# Patient Record
Sex: MALE | URBAN - METROPOLITAN AREA
[De-identification: names, ages, dates, MRNs, and addresses within clinical notes are randomized per-mention and may not be internally consistent; named-entity substitution may affect disease eponyms.]

---

## 2018-12-25 ENCOUNTER — HOSPITAL ENCOUNTER (EMERGENCY)
Facility: CLINIC | Age: 28
Discharge: HOME | End: 2018-12-25
Attending: EMERGENCY MEDICINE | Admitting: EMERGENCY MEDICINE

## 2018-12-25 ENCOUNTER — APPOINTMENT (OUTPATIENT)
Dept: GENERAL RADIOLOGY | Facility: CLINIC | Age: 28
End: 2018-12-25
Attending: EMERGENCY MEDICINE

## 2018-12-25 VITALS
BODY MASS INDEX: 22.76 KG/M2 | HEART RATE: 89 BPM | TEMPERATURE: 98 F | SYSTOLIC BLOOD PRESSURE: 119 MMHG | RESPIRATION RATE: 16 BRPM | OXYGEN SATURATION: 100 % | DIASTOLIC BLOOD PRESSURE: 84 MMHG | WEIGHT: 145 LBS | HEIGHT: 67 IN

## 2018-12-25 DIAGNOSIS — S43.005A SHOULDER DISLOCATION, LEFT, INITIAL ENCOUNTER: ICD-10-CM

## 2018-12-25 PROCEDURE — 73030 X-RAY EXAM OF SHOULDER: CPT | Mod: LT

## 2018-12-25 PROCEDURE — 23650 CLTX SHO DSLC W/MNPJ WO ANES: CPT | Mod: LT

## 2018-12-25 PROCEDURE — 99283 EMERGENCY DEPT VISIT LOW MDM: CPT | Mod: 25

## 2018-12-25 PROCEDURE — 40000986 XR SHOULDER 2 VIEW LEFT: Mod: LT

## 2018-12-25 ASSESSMENT — MIFFLIN-ST. JEOR: SCORE: 1596.35

## 2018-12-25 NOTE — ED AVS SNAPSHOT
Emergency Department  [address] [place] [id number]  Phone:  [phone]  Fax:  [phone]                                    Robbin Scott   MRN: [id number]    Department:   Emergency Department   Date of Visit:  12/25/2018           After Visit Summary Signature Page    I have received my discharge instructions, and my questions have been answered. I have discussed any challenges I see with this plan with the nurse or doctor.    ..........................................................................................................................................  Patient/Patient Representative Signature      ..........................................................................................................................................  Patient Representative Print Name and Relationship to Patient    ..................................................               ................................................  Date                                   Time    ..........................................................................................................................................  Reviewed by Signature/Title    ...................................................              ..............................................  Date                                               Time          22EPIC Rev 08/18

## 2018-12-25 NOTE — ED PROVIDER NOTES
"  History     Chief Complaint:    Shoulder Injury    HPI   Robbin Scott is a 28 year old male who presents with shoulder injury. The patient reports that he has had his left shoulder dislocated 9 times in the past. Last night he was sleeping when his phone fell and he tried to grab it by reaching backwards and the left shoulder appeared to have dislocated again. He states that he tried to put it back in himself but was unsuccessful.     Allergies:  No known drug allergies.       Medications:    No current medications.     Past Medical History:    Medical history reviewed. No pertinent medical history.      Past Surgical History:    Past surgical history reviewed. No pertinent past surgical history.     Family History:    Family history reviewed. No pertinent family history.     Social History:  The patient was accompanied to the ED by brother.  Smoking Status: Current some day smoker  Smokeless Tobacco: Never Used  Alcohol Use: Positive  Drug Use: Negative      Review of Systems   Musculoskeletal:        Shoulder pain   All other systems reviewed and are negative.    Physical Exam     Patient Vitals for the past 24 hrs:   BP Temp Temp src Resp SpO2 Height Weight   12/25/18 0939 (!) 146/98 98  F (36.7  C) Oral 16 98 % 1.702 m (5' 7\") 65.8 kg (145 lb)      Physical Exam    Constitutional: Young  male, supine  Musculoskeletal: Left shoulder obvious anterior dislocation, strong radial pulses. Normal sensation motor and capillary refill distally.   Neurological: Awake and alert appropriately, GSC 15.    Emergency Department Course     Imaging:  Radiology findings were communicated with the patient who voiced understanding of the findings.    XR Shoulder Left 2 Views   Preliminary Result   IMPRESSION:  Shoulder dislocation has been reduced. No evidence of   fracture.       XR Shoulder Left G/E 3 Views   Final Result   IMPRESSION:  Anterior dislocation of the left humeral head. No   evidence of fracture.      JEROME " MD CHASIDY        Reading per radiology     Procedures:    shoulder dislocation reduction: using traction and counter traction, his left shoulder was reduced with minimal discomfort    Emergency Department Course:    0939 Nursing notes and vitals reviewed.    0946 The patient was sent for a XR while in the emergency department, results above.      1010 I performed an exam of the patient as documented above.     1029 The patient was sent for a XR while in the emergency department, results above.      1039 I personally reviewed the imaging results with the patient and answered all related questions prior to discharge.    Impression & Plan      Medical Decision Making:  Robbin Scott is a 28 year old male who presents to the emergency department today for evaluation of recurrent left shoulder dislocation. He states that he was reaching back with his left arm for his cell phone when his shoulder popped out. He has had this happened around 9 times before. He feel that he does not have money to get this shoulder fixed. The patient denies any other injury. On exam he has an obvious anterior shoulder dislocation. I discussed with the patient reducing this, he did not want any medication. Post reduction x-ray shows good position, pre reduction x-ray showed an anterior dislocation. The patient will be discharged home, he has a sling at home that he will us as well as ibuprofen and I will refer him to orthopedics if he does wish to get this stabilized.         Diagnosis:    ICD-10-CM    1. Shoulder dislocation, left, initial encounter S43.005A      Disposition:   The patient is discharged to home.     Discharge Medications:      Scribe Disclosure:  I, Orla Severson, am serving as a scribe at 10:07 AM on 12/25/2018 to document services personally performed by Rodolfo Pierre MD based on my observations and the provider's statements to me.      EMERGENCY DEPARTMENT       Rodolfo Pierre MD  12/25/18 5162